# Patient Record
Sex: MALE | URBAN - METROPOLITAN AREA
[De-identification: names, ages, dates, MRNs, and addresses within clinical notes are randomized per-mention and may not be internally consistent; named-entity substitution may affect disease eponyms.]

---

## 2017-04-25 ENCOUNTER — IMPORTED ENCOUNTER (OUTPATIENT)
Dept: URBAN - METROPOLITAN AREA CLINIC 50 | Facility: CLINIC | Age: 82
End: 2017-04-25

## 2017-04-25 NOTE — PATIENT DISCUSSION
"""Dr. Alaina Looney to review testing with pt. "" ""Reassured patient that intraocular pressures (IOPs) are at target levels and other ocular findings are stable. Continue present management and/or medication(s).  Follow up as directed. """

## 2018-01-18 ENCOUNTER — IMPORTED ENCOUNTER (OUTPATIENT)
Dept: URBAN - METROPOLITAN AREA CLINIC 50 | Facility: CLINIC | Age: 83
End: 2018-01-18

## 2019-02-05 ENCOUNTER — IMPORTED ENCOUNTER (OUTPATIENT)
Dept: URBAN - METROPOLITAN AREA CLINIC 50 | Facility: CLINIC | Age: 84
End: 2019-02-05

## 2019-02-05 NOTE — PATIENT DISCUSSION
"""Continue Azopt left eye twice a day ."" ""Continue Alphagan P 0.1% left eye twice a day ."" ""Continue Lumigan left eye at bedtime ."""

## 2019-11-07 ENCOUNTER — IMPORTED ENCOUNTER (OUTPATIENT)
Dept: URBAN - METROPOLITAN AREA CLINIC 50 | Facility: CLINIC | Age: 84
End: 2019-11-07

## 2021-04-23 ASSESSMENT — PACHYMETRY
OS_CT_UM: 619

## 2021-04-23 ASSESSMENT — VISUAL ACUITY
OS_CC: 20/20-1
OS_SC: 20/25
OS_CC: J1
OS_CC: 20/25
OD_CC: J1+
OD_CC: J1
OS_CC: J1+
OS_CC: 20/25-

## 2021-04-23 ASSESSMENT — TONOMETRY
OS_IOP_MMHG: 14
OS_IOP_MMHG: 24
OS_IOP_MMHG: 18
OS_IOP_MMHG: 14
OS_IOP_MMHG: 20
OS_IOP_MMHG: 18
OS_IOP_MMHG: 11
OS_IOP_MMHG: 15